# Patient Record
Sex: MALE | Race: WHITE | NOT HISPANIC OR LATINO | URBAN - METROPOLITAN AREA
[De-identification: names, ages, dates, MRNs, and addresses within clinical notes are randomized per-mention and may not be internally consistent; named-entity substitution may affect disease eponyms.]

---

## 2019-01-05 PROBLEM — Z00.00 ENCOUNTER FOR PREVENTIVE HEALTH EXAMINATION: Status: ACTIVE | Noted: 2019-01-05

## 2019-02-04 ENCOUNTER — OUTPATIENT (OUTPATIENT)
Dept: OUTPATIENT SERVICES | Facility: HOSPITAL | Age: 39
LOS: 1 days | End: 2019-02-04
Payer: COMMERCIAL

## 2019-02-04 ENCOUNTER — APPOINTMENT (OUTPATIENT)
Dept: SURGERY | Facility: CLINIC | Age: 39
End: 2019-02-04
Payer: COMMERCIAL

## 2019-02-04 VITALS
HEART RATE: 67 BPM | DIASTOLIC BLOOD PRESSURE: 80 MMHG | RESPIRATION RATE: 20 BRPM | TEMPERATURE: 96 F | SYSTOLIC BLOOD PRESSURE: 128 MMHG | WEIGHT: 187.83 LBS | HEIGHT: 67 IN

## 2019-02-04 VITALS
SYSTOLIC BLOOD PRESSURE: 116 MMHG | TEMPERATURE: 98.2 F | HEIGHT: 67 IN | BODY MASS INDEX: 29.19 KG/M2 | OXYGEN SATURATION: 98 % | HEART RATE: 76 BPM | WEIGHT: 186 LBS | DIASTOLIC BLOOD PRESSURE: 80 MMHG | RESPIRATION RATE: 16 BRPM

## 2019-02-04 DIAGNOSIS — Z83.3 FAMILY HISTORY OF DIABETES MELLITUS: ICD-10-CM

## 2019-02-04 DIAGNOSIS — Z82.0 FAMILY HISTORY OF EPILEPSY AND OTHER DISEASES OF THE NERVOUS SYSTEM: ICD-10-CM

## 2019-02-04 DIAGNOSIS — K42.9 UMBILICAL HERNIA WITHOUT OBSTRUCTION OR GANGRENE: ICD-10-CM

## 2019-02-04 DIAGNOSIS — Z01.818 ENCOUNTER FOR OTHER PREPROCEDURAL EXAMINATION: ICD-10-CM

## 2019-02-04 DIAGNOSIS — Z29.9 ENCOUNTER FOR PROPHYLACTIC MEASURES, UNSPECIFIED: ICD-10-CM

## 2019-02-04 DIAGNOSIS — Z80.1 FAMILY HISTORY OF MALIGNANT NEOPLASM OF TRACHEA, BRONCHUS AND LUNG: ICD-10-CM

## 2019-02-04 DIAGNOSIS — Z78.9 OTHER SPECIFIED HEALTH STATUS: ICD-10-CM

## 2019-02-04 PROCEDURE — G0463: CPT

## 2019-02-04 PROCEDURE — 86900 BLOOD TYPING SEROLOGIC ABO: CPT

## 2019-02-04 PROCEDURE — 85730 THROMBOPLASTIN TIME PARTIAL: CPT

## 2019-02-04 PROCEDURE — 85610 PROTHROMBIN TIME: CPT

## 2019-02-04 PROCEDURE — 99244 OFF/OP CNSLTJ NEW/EST MOD 40: CPT

## 2019-02-04 PROCEDURE — 80048 BASIC METABOLIC PNL TOTAL CA: CPT

## 2019-02-04 PROCEDURE — 86850 RBC ANTIBODY SCREEN: CPT

## 2019-02-04 PROCEDURE — 36415 COLL VENOUS BLD VENIPUNCTURE: CPT

## 2019-02-04 PROCEDURE — 85027 COMPLETE CBC AUTOMATED: CPT

## 2019-02-04 PROCEDURE — 86901 BLOOD TYPING SEROLOGIC RH(D): CPT

## 2019-02-04 NOTE — H&P PST ADULT - NSANTHOSAYNRD_GEN_A_CORE
No. LANNY screening performed.  STOP BANG Legend: 0-2 = LOW Risk; 3-4 = INTERMEDIATE Risk; 5-8 = HIGH Risk

## 2019-02-04 NOTE — PHYSICAL EXAM
[JVD] : no jugular venous distention  [Abdominal Masses] : No abdominal masses [Abdomen Tenderness] : ~T ~M No abdominal tenderness [No Rash or Lesion] : No rash or lesion [Purpura] : no purpura  [Petechiae] : no petechiae [Skin Ulcer] : no ulcer [Skin Induration] : no induration [Alert] : alert [Oriented to Person] : oriented to person [Oriented to Place] : oriented to place [Oriented to Time] : oriented to time [Calm] : calm [de-identified] : non toxic, in no acute distress [de-identified] : NC/AT PERRL EOMI no scleral icterus [de-identified] : trachea midline no gross mass  [de-identified] : no audible wheezing or stridor  [de-identified] : mildly obese soft, with no localizing tenderness, no masses, no guarding or rebound [de-identified] : FROM of all extremities with no gross deformity or angulation, there is a small reducible umbilical hernia with mild associated tenderness, no ventral hernia  [de-identified] : mood is calm

## 2019-02-04 NOTE — ASSESSMENT
[FreeTextEntry1] : The patient is a 38 year old male with mild umbilical pain and an umbilical hernia.  The patient has been advised that he will benefit from an open umbilical hernia repair with possible mesh.  The risks, benefits, and alternatives including the option of doing nothing to an open umbilical hernia repair with possible mesh were discussed.  The potential complications including but not limited to infection, bleeding, hernia recurrence, chronic post-operative pain, and seroma formation were discussed.  The patient was educated regarding the signs and symptoms of hernia strangulation and advised to seek immediate MD evaluation should this occur.  The patient understands and wishes to proceed at the next available time.  \par \par

## 2019-02-04 NOTE — CONSULT LETTER
[Dear  ___] : Dear  [unfilled], [Consult Letter:] : I had the pleasure of evaluating your patient, [unfilled]. [( Thank you for referring [unfilled] for consultation for _____ )] : Thank you for referring [unfilled] for consultation for [unfilled] [Please see my note below.] : Please see my note below. [Consult Closing:] : Thank you very much for allowing me to participate in the care of this patient.  If you have any questions, please do not hesitate to contact me. [Sincerely,] : Sincerely, [FreeTextEntry3] : Conner Dunlap MD, FACS\par Chair of Surgery\par [facility]\par

## 2019-02-04 NOTE — H&P PST ADULT - FAMILY HISTORY
Mother  Still living? No  Family history of lung cancer, Age at diagnosis: Age Unknown     Father  Still living? Yes, Estimated age: Age Unknown  Family history of diabetes mellitus, Age at diagnosis: Age Unknown

## 2019-02-04 NOTE — H&P PST ADULT - ASSESSMENT
39 y/o male seen today pre-op open umbilical hernia with possible MESH. Surgery protocol reviewed with pt today.     CAPRINI SCORE [CLOT]    AGE RELATED RISK FACTORS                                                       MOBILITY RELATED FACTORS  [x ] Age 41-60 years                                            (1 Point)                  [ ] Bed rest                                                        (1 Point)  [ ] Age: 61-74 years                                           (2 Points)                 [ ] Plaster cast                                                   (2 Points)  [ ] Age= 75 years                                              (3 Points)                 [ ] Bed bound for more than 72 hours                 (2 Points)    DISEASE RELATED RISK FACTORS                                               GENDER SPECIFIC FACTORS  [ ] Edema in the lower extremities                       (1 Point)                  [ ] Pregnancy                                                     (1 Point)  [ ] Varicose veins                                               (1 Point)                  [ ] Post-partum < 6 weeks                                   (1 Point)             [ ] BMI > 25 Kg/m2                                            (1 Point)                  [ ] Hormonal therapy  or oral contraception          (1 Point)                 [ ] Sepsis (in the previous month)                        (1 Point)                  [ ] History of pregnancy complications                 (1 point)  [ ] Pneumonia or serious lung disease                                               [ ] Unexplained or recurrent                     (1 Point)           (in the previous month)                               (1 Point)  [ ] Abnormal pulmonary function test                     (1 Point)                 SURGERY RELATED RISK FACTORS  [ ] Acute myocardial infarction                              (1 Point)                 [ ]  Section                                             (1 Point)  [ ] Congestive heart failure (in the previous month)  (1 Point)               [ ] Minor surgery                                                  (1 Point)   [ ] Inflammatory bowel disease                             (1 Point)                 [ ] Arthroscopic surgery                                        (2 Points)  [ ] Central venous access                                      (2 Points)                [ x] General surgery lasting more than 45 minutes   (2 Points)       [ ] Stroke (in the previous month)                          (5 Points)               [ ] Elective arthroplasty                                         (5 Points)                                                                                                                                               HEMATOLOGY RELATED FACTORS                                                 TRAUMA RELATED RISK FACTORS  [ ] Prior episodes of VTE                                     (3 Points)                [ ] Fracture of the hip, pelvis, or leg                       (5 Points)  [ ] Positive family history for VTE                         (3 Points)                 [ ] Acute spinal cord injury (in the previous month)  (5 Points)  [ ] Prothrombin 72491 A                                     (3 Points)                 [ ] Paralysis  (less than 1 month)                             (5 Points)  [ ] Factor V Leiden                                             (3 Points)                  [ ] Multiple Trauma within 1 month                        (5 Points)  [ ] Lupus anticoagulants                                     (3 Points)                                                           [ ] Anticardiolipin antibodies                               (3 Points)                                                       [ ] High homocysteine in the blood                      (3 Points)                                             [ ] Other congenital or acquired thrombophilia      (3 Points)                                                [ ] Heparin induced thrombocytopenia                  (3 Points)                                          Total Score [  3        ]  OPIOID RISK TOOL    UMBERTO EACH BOX THAT APPLIES AND ADD TOTALS AT THE END    FAMILY HISTORY OF SUBSTANCE ABUSE                 FEMALE         MALE                                                Alcohol                             [  ]1 pt          [  ]3pts                                               Illegal Durgs                     [  ]2 pts        [  ]3pts                                               Rx Drugs                           [  ]4 pts        [  ]4 pts    PERSONAL HISTORY OF SUBSTANCE ABUSE                                                                                          Alcohol                             [  ]3 pts       [  ]3 pts                                               Illegal Drugs                     [  ]4 pts        [  ]4 pts                                               Rx Drugs                           [  ]5 pts        [  ]5 pts    AGE BETWEEN 16-45 YEARS                                      [  ]1 pt         [  ]1 pt    HISTORY OF PREADOLESCENT   SEXUAL ABUSE                                                             [  ]3 pts        [  ]0pts    PSYCHOLOGICAL DISEASE                     ADD, OCD, Bipolar, Schizophrenia        [  ]2 pts         [  ]2 pts                      Depression                                               [  ]1 pt           [  ]1 pt           SCORING TOTAL   (add numbers and type here)              (*0**)                                     A score of 3 or lower indicated LOW risk for future opioid abuse  A score of 4 to 7 indicated moderate risk for future opioid abuse  A score of 8 or higher indicates a high risk for opioid abuse

## 2019-02-04 NOTE — HISTORY OF PRESENT ILLNESS
[de-identified] : The patient comes to the office in consultation by Jennie CLEMENT for umbilical pain and a lump in the belly button.  The patient reports that he has noted a burning discomfort in the belly button with coughing, sneezing and at times of exertion.  He notes that for a very long time the belly button has always been sensitive, but not as uncomfortable as now.  He has no abdominal pain, no nausea, and no changes in his bowel function.

## 2019-02-04 NOTE — H&P PST ADULT - HISTORY OF PRESENT ILLNESS
37 y/o male seen today pre-op open umbilical hernia with possible MESH. Pt unable to recall any recent trauma, states "I noticed bulging belly button in December with no pain", was seen by his PCP who referred him to his surgeon. Pt states occasional discomfort, Pt accompanied to this visit by his daughter.

## 2019-02-04 NOTE — H&P PST ADULT - ATTENDING COMMENTS
The risks, benefits, and alternatives including the option of doing nothing to an open umbilical hernia repair with possible mesh were discussed.  The potential complications including but not limited to infection, bleeding, recurrent herniation, and possible chronic post operative pain were discussed.  The patient admitted understanding and agrees to proceed as planned.

## 2019-02-06 ENCOUNTER — TRANSCRIPTION ENCOUNTER (OUTPATIENT)
Age: 39
End: 2019-02-06

## 2019-02-07 ENCOUNTER — RESULT REVIEW (OUTPATIENT)
Age: 39
End: 2019-02-07

## 2019-02-07 ENCOUNTER — OUTPATIENT (OUTPATIENT)
Dept: OUTPATIENT SERVICES | Facility: HOSPITAL | Age: 39
LOS: 1 days | End: 2019-02-07
Payer: COMMERCIAL

## 2019-02-07 VITALS
RESPIRATION RATE: 16 BRPM | HEIGHT: 67 IN | OXYGEN SATURATION: 98 % | SYSTOLIC BLOOD PRESSURE: 128 MMHG | DIASTOLIC BLOOD PRESSURE: 70 MMHG | HEART RATE: 78 BPM | TEMPERATURE: 97 F | WEIGHT: 187.83 LBS

## 2019-02-07 VITALS
RESPIRATION RATE: 17 BRPM | OXYGEN SATURATION: 98 % | DIASTOLIC BLOOD PRESSURE: 74 MMHG | HEART RATE: 63 BPM | SYSTOLIC BLOOD PRESSURE: 124 MMHG

## 2019-02-07 DIAGNOSIS — K42.9 UMBILICAL HERNIA WITHOUT OBSTRUCTION OR GANGRENE: ICD-10-CM

## 2019-02-07 PROCEDURE — 88302 TISSUE EXAM BY PATHOLOGIST: CPT | Mod: 26

## 2019-02-07 PROCEDURE — 49585: CPT

## 2019-02-07 PROCEDURE — 88302 TISSUE EXAM BY PATHOLOGIST: CPT

## 2019-02-07 RX ORDER — OXYCODONE AND ACETAMINOPHEN 5; 325 MG/1; MG/1
1 TABLET ORAL EVERY 4 HOURS
Qty: 0 | Refills: 0 | Status: DISCONTINUED | OUTPATIENT
Start: 2019-02-07 | End: 2019-02-07

## 2019-02-07 RX ORDER — CEFAZOLIN SODIUM 1 G
2000 VIAL (EA) INJECTION ONCE
Qty: 0 | Refills: 0 | Status: COMPLETED | OUTPATIENT
Start: 2019-02-07 | End: 2019-02-07

## 2019-02-07 RX ORDER — FENTANYL CITRATE 50 UG/ML
25 INJECTION INTRAVENOUS
Qty: 0 | Refills: 0 | Status: DISCONTINUED | OUTPATIENT
Start: 2019-02-07 | End: 2019-02-07

## 2019-02-07 RX ORDER — HYDROMORPHONE HYDROCHLORIDE 2 MG/ML
1 INJECTION INTRAMUSCULAR; INTRAVENOUS; SUBCUTANEOUS EVERY 6 HOURS
Qty: 0 | Refills: 0 | Status: DISCONTINUED | OUTPATIENT
Start: 2019-02-07 | End: 2019-02-07

## 2019-02-07 RX ORDER — ONDANSETRON 8 MG/1
4 TABLET, FILM COATED ORAL ONCE
Qty: 0 | Refills: 0 | Status: DISCONTINUED | OUTPATIENT
Start: 2019-02-07 | End: 2019-02-07

## 2019-02-07 RX ORDER — SODIUM CHLORIDE 9 MG/ML
1000 INJECTION, SOLUTION INTRAVENOUS
Qty: 0 | Refills: 0 | Status: DISCONTINUED | OUTPATIENT
Start: 2019-02-07 | End: 2019-02-07

## 2019-02-07 RX ORDER — OXYCODONE AND ACETAMINOPHEN 5; 325 MG/1; MG/1
2 TABLET ORAL EVERY 6 HOURS
Qty: 0 | Refills: 0 | Status: DISCONTINUED | OUTPATIENT
Start: 2019-02-07 | End: 2019-02-07

## 2019-02-07 RX ORDER — SODIUM CHLORIDE 9 MG/ML
3 INJECTION INTRAMUSCULAR; INTRAVENOUS; SUBCUTANEOUS ONCE
Qty: 0 | Refills: 0 | Status: DISCONTINUED | OUTPATIENT
Start: 2019-02-07 | End: 2019-02-07

## 2019-02-07 RX ADMIN — Medication 100 MILLIGRAM(S): at 18:24

## 2019-02-07 RX ADMIN — SODIUM CHLORIDE 125 MILLILITER(S): 9 INJECTION, SOLUTION INTRAVENOUS at 19:30

## 2019-02-07 NOTE — ASU DISCHARGE PLAN (ADULT/PEDIATRIC). - ACTIVITY LEVEL
no exercise/no heavy lifting/no tub baths/no intercourse/max lifting capacity 20 pounds/no sports/gym

## 2019-02-07 NOTE — BRIEF OPERATIVE NOTE - PROCEDURE
<<-----Click on this checkbox to enter Procedure Umbilical hernia repair  02/07/2019    Active  PE[facility]EE Umbilical hernia repair  02/07/2019    Active  Ronal Naranjo

## 2019-02-13 LAB — SURGICAL PATHOLOGY STUDY: SIGNIFICANT CHANGE UP

## 2019-02-15 ENCOUNTER — APPOINTMENT (OUTPATIENT)
Dept: SURGERY | Facility: CLINIC | Age: 39
End: 2019-02-15
Payer: COMMERCIAL

## 2019-02-15 VITALS
TEMPERATURE: 98.5 F | HEART RATE: 70 BPM | WEIGHT: 188 LBS | BODY MASS INDEX: 29.51 KG/M2 | OXYGEN SATURATION: 98 % | DIASTOLIC BLOOD PRESSURE: 80 MMHG | SYSTOLIC BLOOD PRESSURE: 122 MMHG | HEIGHT: 67 IN | RESPIRATION RATE: 16 BRPM

## 2019-02-15 PROCEDURE — 99024 POSTOP FOLLOW-UP VISIT: CPT

## 2019-02-15 NOTE — ASSESSMENT
[FreeTextEntry1] : The patient is stable and doing well following an open umbilical hernia repair. The patient will avoid heavy or strenuous activity and follow up in 2 weeks time or sooner should any problems or issues arise.

## 2019-02-15 NOTE — HISTORY OF PRESENT ILLNESS
[de-identified] : The patient is with only mild periumbilical discomfort.  He has no nausea, no vomit, and no fevers or chills.

## 2019-02-15 NOTE — PHYSICAL EXAM
[JVD] : no jugular venous distention  [Abdominal Masses] : No abdominal masses [Abdomen Tenderness] : ~T ~M No abdominal tenderness [No Rash or Lesion] : No rash or lesion [Purpura] : no purpura  [Petechiae] : no petechiae [Skin Ulcer] : no ulcer [Skin Induration] : no induration [Alert] : alert [Oriented to Person] : oriented to person [Oriented to Place] : oriented to place [Oriented to Time] : oriented to time [Calm] : calm [de-identified] : non toxic, in no acute distress [de-identified] : NC/AT PERRL EOMI no scleral icterus [de-identified] : trachea midline no gross mass  [de-identified] : no audible wheezing or stridor  [de-identified] : mildly obese soft, with no localizing tenderness, no masses, no guarding or rebound [de-identified] : FROM of all extremities with no gross deformity or angulation, there is no evidence of recurrent umbilical hernia  [de-identified] : surgical incision is healing well without infection, there is mild periumbilical ecchymosis  [de-identified] : mood is calm

## 2019-03-01 ENCOUNTER — APPOINTMENT (OUTPATIENT)
Dept: SURGERY | Facility: CLINIC | Age: 39
End: 2019-03-01
Payer: COMMERCIAL

## 2019-03-01 VITALS
HEIGHT: 67 IN | BODY MASS INDEX: 29.82 KG/M2 | OXYGEN SATURATION: 98 % | HEART RATE: 73 BPM | WEIGHT: 190 LBS | DIASTOLIC BLOOD PRESSURE: 80 MMHG | TEMPERATURE: 98.5 F | SYSTOLIC BLOOD PRESSURE: 128 MMHG

## 2019-03-01 PROCEDURE — 99024 POSTOP FOLLOW-UP VISIT: CPT

## 2019-03-01 NOTE — PHYSICAL EXAM
[JVD] : no jugular venous distention  [Abdominal Masses] : No abdominal masses [Abdomen Tenderness] : ~T ~M No abdominal tenderness [No Rash or Lesion] : No rash or lesion [Purpura] : no purpura  [Petechiae] : no petechiae [Skin Ulcer] : no ulcer [Skin Induration] : no induration [Alert] : alert [Oriented to Person] : oriented to person [Oriented to Place] : oriented to place [Oriented to Time] : oriented to time [Calm] : calm [de-identified] : non toxic, in no acute distress [de-identified] : NC/AT PERRL EOMI no scleral icterus [de-identified] : trachea midline no gross mass  [de-identified] : no audible wheezing or stridor  [de-identified] : mildly obese soft, with mild tenderness to palpation of the umbilicus, no masses, no guarding or rebound [de-identified] : FROM of all extremities with no gross deformity or angulation, there remains no evidence of recurrent umbilical hernia  [de-identified] : surgical incision is healing well without infection, there is mild periumbilical ecchymosis  [de-identified] : mood is calm

## 2019-03-01 NOTE — HISTORY OF PRESENT ILLNESS
[de-identified] : The patient returns to the office with no complaints of abdominal pain, nausea, or vomit.  He is still experiencing a burning pain near the umbilicus with activity and exertion.  He has been limiting the lifting to under 20 pound but states he still feels some discomfort.

## 2019-03-01 NOTE — ASSESSMENT
[FreeTextEntry1] : The patient is improving following an open umbilical hernia repair.  The patient is still with mild discomfort at the umbilicus.  He was advised to initiate the use of Ibuprofen and cool compresses.  He will avoid lifting more than 20 pounds and follow up in 2 weeks time or sooner should any problems or issues arise.

## 2019-03-15 ENCOUNTER — APPOINTMENT (OUTPATIENT)
Dept: SURGERY | Facility: CLINIC | Age: 39
End: 2019-03-15
Payer: COMMERCIAL

## 2019-03-15 VITALS
HEART RATE: 77 BPM | DIASTOLIC BLOOD PRESSURE: 81 MMHG | BODY MASS INDEX: 29.98 KG/M2 | RESPIRATION RATE: 16 BRPM | OXYGEN SATURATION: 98 % | HEIGHT: 67 IN | SYSTOLIC BLOOD PRESSURE: 123 MMHG | TEMPERATURE: 98.2 F | WEIGHT: 191 LBS

## 2019-03-15 PROCEDURE — 99024 POSTOP FOLLOW-UP VISIT: CPT

## 2019-03-15 NOTE — HISTORY OF PRESENT ILLNESS
[de-identified] : The patient is with complaints of burning pain at the umbilicus that followed lifting his daughter.  He reports that the pain is worse after exertion and comes in waves intermittently with straining.  He has no nausea, no vomit, no fevers or chills.

## 2019-03-15 NOTE — PHYSICAL EXAM
[JVD] : no jugular venous distention  [Abdominal Masses] : No abdominal masses [Abdomen Tenderness] : ~T ~M No abdominal tenderness [No Rash or Lesion] : No rash or lesion [Purpura] : no purpura  [Petechiae] : no petechiae [Skin Ulcer] : no ulcer [Skin Induration] : no induration [Alert] : alert [Oriented to Person] : oriented to person [Oriented to Place] : oriented to place [Oriented to Time] : oriented to time [Calm] : calm [de-identified] : non toxic, in no acute distress [de-identified] : NC/AT PERRL EOMI no scleral icterus [de-identified] : trachea midline no gross mass  [de-identified] : no audible wheezing or stridor  [de-identified] : mildly obese soft, there remains mild tenderness to palpation of the umbilicus, no masses, no guarding or rebound [de-identified] : FROM of all extremities with no gross deformity or angulation, there remains no evidence of recurrent umbilical hernia  [de-identified] : surgical incision is healed well without infection, there remains mild periumbilical tenderness, the ecchymosis has resolved  [de-identified] : mood is calm

## 2019-03-15 NOTE — ASSESSMENT
[FreeTextEntry1] : The patient is with mild periumbilical tenderness following an open umbilical hernia repair. He has been advised to avoid heavy or strenuous activity and to continue with the use of Ibuprofen.  He will also use cool compresses to the area and follow up in 5 days or sooner should any problems or issues arise.

## 2019-03-20 ENCOUNTER — APPOINTMENT (OUTPATIENT)
Dept: SURGERY | Facility: CLINIC | Age: 39
End: 2019-03-20
Payer: COMMERCIAL

## 2019-03-20 VITALS
DIASTOLIC BLOOD PRESSURE: 79 MMHG | OXYGEN SATURATION: 97 % | RESPIRATION RATE: 16 BRPM | TEMPERATURE: 98.6 F | WEIGHT: 194.31 LBS | SYSTOLIC BLOOD PRESSURE: 119 MMHG | HEIGHT: 67 IN | BODY MASS INDEX: 30.5 KG/M2 | HEART RATE: 84 BPM

## 2019-03-20 DIAGNOSIS — R10.33 PERIUMBILICAL PAIN: ICD-10-CM

## 2019-03-20 DIAGNOSIS — K42.9 UMBILICAL HERNIA W/OUT OBSTRUCTION OR GANGRENE: ICD-10-CM

## 2019-03-20 PROCEDURE — 99024 POSTOP FOLLOW-UP VISIT: CPT

## 2019-03-20 NOTE — PHYSICAL EXAM
[Abdominal Masses] : No abdominal masses [JVD] : no jugular venous distention  [No Rash or Lesion] : No rash or lesion [Abdomen Tenderness] : ~T ~M No abdominal tenderness [Petechiae] : no petechiae [Purpura] : no purpura  [Skin Ulcer] : no ulcer [Skin Induration] : no induration [Oriented to Person] : oriented to person [Alert] : alert [Oriented to Place] : oriented to place [Oriented to Time] : oriented to time [Calm] : calm [de-identified] : non toxic, in no acute distress [de-identified] : trachea midline no gross mass  [de-identified] : NC/AT PERRL EOMI no scleral icterus [de-identified] : no audible wheezing or stridor  [de-identified] : mildly obese soft, there remains mild tenderness to palpation of the umbilicus, no masses, no guarding or rebound [de-identified] : FROM of all extremities with no gross deformity or angulation, there remains no evidence of recurrent umbilical hernia  [de-identified] : surgical incision is healed well without infection, there remains mild periumbilical tenderness, the ecchymosis has resolved  [de-identified] : mood is calm

## 2019-03-20 NOTE — HISTORY OF PRESENT ILLNESS
[de-identified] : The patient returns to the office with less discomfort but not complete resolution of the discomfort. He states that the area is still sensitive especially at times of exertion.

## 2019-03-20 NOTE — ASSESSMENT
[FreeTextEntry1] : The patient is stable and improving following an open umbilical hernia repair. He at this time has been cleared to resume activity as tolerated.  He will follow up as needed from this point forward.

## 2020-01-22 NOTE — BRIEF OPERATIVE NOTE - DISPOSITION
[de-identified] : 67y/o male with right foot mass, resolved 5th metatarsal stress fracture, and cervical spondylosis\par - MRI noncontrast right foot to further evaluate the mass\par - Encouraged return to PT, HEP for the cervical spondylosis\par - Meloxicam refilled\par - Will call back with MRI results\par - Follow up 6 weeks PACU PACU to home

## 2020-09-14 PROBLEM — K42.9 UMBILICAL HERNIA WITHOUT OBSTRUCTION OR GANGRENE: Chronic | Status: ACTIVE | Noted: 2019-02-04

## 2022-11-03 NOTE — H&P PST ADULT - BP NONINVASIVE MEAN (MM HG)
Chief Complaint   Patient presents with   • Physical     Patient presenting in clinic today for his annual complete physical exam. He has no concerns at time of rooming.       HPI: Brooks Zhang is a pleasant 35 year old patient who presentsfor his routine physical exam.  He is feeling systemically well.  He is fasting and would like to have fasting labs.  He was last seen in clinic in  10/2020.    GERD:  He has not been having any break through reflux symptoms which he is aware but would like to have a refill of his omeprazole 20 mg.  Patient is having symptoms of increased phlegm over the past year.  He indicates he is not certain if this phlegm is related to his seasonal allergies of perhaps from his reflux. He is only having acid reflex symptoms if he eats something he knows he should not. He does have 1 soda a day and also likes carbonated flavored water.  Denies dysphagia, appetite changes, melena, or weight loss.   He does not drink alcohol, nonsmoker.    Patient does take allegra for seasonal allergies. States patient's family does have chronic PND.    Last dental:  Tries for annually  Last eye:  Annually has glaucoma  Exercise:  No regular routine  Diet:  No food restrictions-reduced his soda intake over past 2 years  Work:  Works for [employer]  Sexually Active/Partner:  Denies currently sexually active  STD Concerns:  Denies any symptoms and declines testing    Health Maintenance Due   Topic Date Due   • Hepatitis B Vaccine (1 of 3 - 3-dose series) Never done   • COVID-19 Vaccine (3 - Booster for Pfizer series) 10/11/2021   • Influenza Vaccine (1) Never done       Patient is due for topics as listed above but is not proceeding with Immunization(s) COVID-19, Hep B and Influenza at this time. Education provided for Immunization(s) COVID-19, Hep B and Influenza.    We did discuss  And he is planning on receiving recommended new covid booster along with influenza vaccine  After thanksgiving  when he does not have to return to work the following day.     Recent Review Flowsheet Data     Date 11/4/2022    Adult PHQ 2 Score 1    Adult PHQ 2 Interpretation No further screening needed    Little interest or pleasure in activity? Several days    Feeling down, depressed or hopeless? Not at all    Adult PHQ 9 Score 2    Adult PHQ 9 Interpretation Minimal Depression    Trouble falling or staying asleep or sleeping all the time? Several days    Feeling tired or having little energy? Not at all    Poor appetite or overeating? Not at all    Feeling bad about yourself or that you are a failure or have let yourself or family down? Not at all    Trouble concentrating on things such as reading the newspaper or watching TV? Not at all    Moving or speaking slowly that other people have noticed or the opposite - being so fidgety or restless that you have been moving around a lot more than usual? Not at all    Thoughts that you would be better off dead or of hurting yourself in some way? 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     Priority: Low   • Cataract 01/14/2015     Priority: Low   • Anxiety disorder      Priority: Low   • GERD (gastroesophageal reflux disease)      Priority: Low       Past Medical History:   Diagnosis Date   • Anxiety    • Bilateral cataracts     focal cataracts   • GERD (gastroesophageal reflux disease)    • Juvenile glaucoma     glaucoma surgery [facility] as infant (Dr Garcia)   • Weight loss      Has been on weight loss diet       Family History   Problem Relation Age of Onset   • Cancer, Breast Maternal Grandmother    • Cancer, Skin Mother    • Diabetes Maternal Grandfather    • Patient is unaware of any medical problems Paternal Grandmother    • Diabetes Paternal Grandfather    • Hyperlipidemia Father    • Depression Brother    • Anxiety disorder Brother        SOCIAL HISTORY:  Single male, nonsmoker, no alcohol or drug use, minimum caffeine intake.     Current Outpatient Medications   Medication Sig Dispense Refill   • fexofenadine (ALLEGRA) 180 MG tablet Take 180 mg by mouth daily.     • omeprazole (PriLOSEC) 40 MG capsule Take 1 capsule by mouth daily. Notify provider with update prior to refill 90 capsule 0   • timolol (TIMOPTIC) 0.5 % ophthalmic solution PLACE 1 DROP INTO LEFT EYE EVERY MORNING 10 mL 3   • Simbrinza 1-0.2 % ophthalmic suspension SHAKE LIQUID AND INSTILL 1 DROP IN LEFT EYE TWICE DAILY 8 mL 3     No current facility-administered medications for this visit.       ALLERGIES:   Allergen Reactions   • Seasonal Other (See Comments)     Fatigue and runny nose       ROS:   GENERAL: Denies fever, chills, or appetite changes, weight changes    SKIN: Denies any concerning lesions, rash or changes  EYES: Denies visual problems.  ENT: Denies hearing or nasal concerns; difficulty swallowing.  CARDIOVASCULAR:  Denies chest pain, palpitations, fatigue or peripheral edema.  RESPIRATORY:  Denies shortness of breath, cough.  GI: Denies abdominal pain, nausea, vomiting, diarrhea, constipation, bloody stools, tarry stools.  : Denies urinary concerns.    MUSCULOSKELETAL: Denies any abnormal joint or muscle pain.   NEUROLOGIC: Denies headache, dizziness, numbness, gait, seizure or memory problems.  PSYCHOLOGICAL: Denies depression and anxiety.    DEPRESSION SCREEN: See RN notes.        PHYSICAL EXAM:  Visit Vitals  /86   Pulse 66   Resp 16   Ht 5' 8\" (1.727 m)   Wt 112.5 kg (248 lb 1.6 oz)   BMI  37.72 kg/m²     Gen: The patient appears well developed, well nourished, stated age, and in no distress  HEENT: Normal cephalic, Atraumatic.  PERRLA, EOMI, fundi are benign. Tympanic membranes are clear bilaterally.  Wearing face mask  NECK: supple, no lymphadenopathy, no thyromegaly, no JVD  LUNGS: Chest symmetric.  Lungs are clear to auscultation.  There is good aeration.  There are no wheezes, rales or rhonchi noted.  HEART: S1,S2, regular rate and rhythm no murmur  ABDOMEN:  Soft, nontender, no masses, no hepatosplenomegaly, no distension, bowel sounds are normoactive.  MALE :  Declines  EXTREMITIES: no erythema, no edema bilateral upper or lower extremities.  NEUROLOGICAL: CN 2-12 grossly intact, DTR's 2+/4+ bilaterally and symmetrical, normal strength  SKIN: warm, moist, without erythema, no abnormal rash or lesion noted on visible skin  MENTAL STATUS: A&O X 3, normal thought, mood and affect, no hallucinations noted.    LABS: LIPID, CMP, CBC, TSH, VIT D     Assessment:   1. Routine adult health maintenance    2. Screening for cholesterol level    3. Screening for diabetes mellitus (DM)    4. Gastroesophageal reflux disease without esophagitis    5. Routine general medical examination at a health care facility    6. Juvenile glaucoma    7. Zonular cataract of both eyes           Plan:  Patient does complain of infrequent postnasal drip that he does associate with his reflux as previous. Did offer to increase medication for 2 weeks then to have him taper back to original dose.     Will try patient on an increase of to Omeprazole 40 mg daily for 1-3 months to see if this helps him with PND, sent to pharmacy as requested.      Also reviewed he should try saline rinse, flonase and switching allegra to zyrtec however he indicates the zyrtec makes him tired. Provided written information about saline rinse.     Continue to follow-up with eye specialist for astigmatism, monitoring for glaucoma and  cataract.    Patient does deny any worsening of anxiety or depression.    Recent PHQ 2/9 Score    PHQ 2:  Date Adult PHQ 2 Score Adult PHQ 2 Interpretation   11/4/2022 1 No further screening needed       PHQ 9:  Date Adult PHQ 9 Score Adult PHQ 9 Interpretation   11/4/2022 2 Minimal Depression     Most Recent DION 7 Score       Date DION 7 Score   10/30/2020 4     We did discuss any interest in medication for sleep and he does decline at this time.  DEPRESSION ASSESSMENT/PLAN:  Mild symptoms, will monitor and reevaluate.       BMI ASSESSMENT/PLAN:  Patient is obese.    40 minutes of physical activity a day     Body mass index is 37.72 kg/m².      Discussed STD screening. Self testicular exams and reviewed how to self-complete monthly  Counseled regarding substance use and continue his reduction of carbonation ; Adequate hydration, encourage moderation of caffeine  Discussed life style modification, increase fruits, vegetables, fiber, decrease fats  30 min of moderate exercise, 5 times per week or 45 min of intensive exercise 3 times per week along with strengthening exercise.  Discussed regular eye exams, dental visits.   Self skin checks. Using sunscreen SPF > 50   Immunizations as per nurse notes    Health Maintenance Due   Topic Date Due   • Hepatitis B Vaccine (1 of 3 - 3-dose series) Never done   • COVID-19 Vaccine (3 - Booster for Pfizer series) 10/11/2021   • Influenza Vaccine (1) Never done       Patient is due for topics as listed above but is not proceeding with Immunization(s) Influenza, Covid or Hep B at this time.  Did provide information on influenza, covid and Hep B vaccinations.      Healthy lifestyle changes were discussed.   Labs to include same as last year.    Will have him follow up in 3 months for GERD and for physical 1 year.   All questions and concerns were addressed.   patient verbalized understanding and agrees with the plan above. All questions were answered.             96

## 2023-06-10 ENCOUNTER — NON-APPOINTMENT (OUTPATIENT)
Age: 43
End: 2023-06-10

## 2023-08-04 ENCOUNTER — NON-APPOINTMENT (OUTPATIENT)
Age: 43
End: 2023-08-04

## 2024-01-16 ENCOUNTER — NON-APPOINTMENT (OUTPATIENT)
Age: 44
End: 2024-01-16

## 2024-07-25 ENCOUNTER — NON-APPOINTMENT (OUTPATIENT)
Age: 44
End: 2024-07-25

## 2024-09-18 NOTE — ASU DISCHARGE PLAN (ADULT/PEDIATRIC). - MEDICATION SUMMARY - MEDICATIONS TO STOP TAKING
{jean pierrek gastro colon/egd/pill cam letters:[id number]}   I will STOP taking the medications listed below when I get home from the hospital:  None